# Patient Record
Sex: MALE | Race: BLACK OR AFRICAN AMERICAN | NOT HISPANIC OR LATINO | URBAN - METROPOLITAN AREA
[De-identification: names, ages, dates, MRNs, and addresses within clinical notes are randomized per-mention and may not be internally consistent; named-entity substitution may affect disease eponyms.]

---

## 2022-08-01 ENCOUNTER — INPATIENT (INPATIENT)
Facility: HOSPITAL | Age: 47
LOS: 2 days | Discharge: HOME | DRG: 603 | End: 2022-08-04
Attending: FAMILY MEDICINE | Admitting: GENERAL PRACTICE
Payer: SELF-PAY

## 2022-08-01 VITALS
DIASTOLIC BLOOD PRESSURE: 115 MMHG | HEART RATE: 119 BPM | SYSTOLIC BLOOD PRESSURE: 134 MMHG | WEIGHT: 304.9 LBS | OXYGEN SATURATION: 97 % | RESPIRATION RATE: 20 BRPM | TEMPERATURE: 97 F

## 2022-08-01 DIAGNOSIS — R73.9 HYPERGLYCEMIA, UNSPECIFIED: ICD-10-CM

## 2022-08-01 DIAGNOSIS — Z29.9 ENCOUNTER FOR PROPHYLACTIC MEASURES, UNSPECIFIED: ICD-10-CM

## 2022-08-01 DIAGNOSIS — M79.89 OTHER SPECIFIED SOFT TISSUE DISORDERS: ICD-10-CM

## 2022-08-01 DIAGNOSIS — R17 UNSPECIFIED JAUNDICE: ICD-10-CM

## 2022-08-01 DIAGNOSIS — A41.9 SEPSIS, UNSPECIFIED ORGANISM: ICD-10-CM

## 2022-08-01 DIAGNOSIS — E87.6 HYPOKALEMIA: ICD-10-CM

## 2022-08-01 LAB
ALBUMIN SERPL ELPH-MCNC: 2.9 G/DL — LOW (ref 3.3–5)
ALP SERPL-CCNC: 106 U/L — SIGNIFICANT CHANGE UP (ref 40–120)
ALT FLD-CCNC: 41 U/L — SIGNIFICANT CHANGE UP (ref 12–78)
ANION GAP SERPL CALC-SCNC: 8 MMOL/L — SIGNIFICANT CHANGE UP (ref 5–17)
APTT BLD: 28.3 SEC — SIGNIFICANT CHANGE UP (ref 27.5–35.5)
AST SERPL-CCNC: 36 U/L — SIGNIFICANT CHANGE UP (ref 15–37)
BASOPHILS # BLD AUTO: 0.05 K/UL — SIGNIFICANT CHANGE UP (ref 0–0.2)
BASOPHILS NFR BLD AUTO: 0.3 % — SIGNIFICANT CHANGE UP (ref 0–2)
BILIRUB SERPL-MCNC: 1.3 MG/DL — HIGH (ref 0.2–1.2)
BUN SERPL-MCNC: 11 MG/DL — SIGNIFICANT CHANGE UP (ref 7–23)
CALCIUM SERPL-MCNC: 9.2 MG/DL — SIGNIFICANT CHANGE UP (ref 8.5–10.1)
CHLORIDE SERPL-SCNC: 104 MMOL/L — SIGNIFICANT CHANGE UP (ref 96–108)
CO2 SERPL-SCNC: 24 MMOL/L — SIGNIFICANT CHANGE UP (ref 22–31)
CREAT SERPL-MCNC: 0.87 MG/DL — SIGNIFICANT CHANGE UP (ref 0.5–1.3)
EGFR: 108 ML/MIN/1.73M2 — SIGNIFICANT CHANGE UP
EOSINOPHIL # BLD AUTO: 0.1 K/UL — SIGNIFICANT CHANGE UP (ref 0–0.5)
EOSINOPHIL NFR BLD AUTO: 0.7 % — SIGNIFICANT CHANGE UP (ref 0–6)
GLUCOSE SERPL-MCNC: 161 MG/DL — HIGH (ref 70–99)
HCT VFR BLD CALC: 40.2 % — SIGNIFICANT CHANGE UP (ref 39–50)
HGB BLD-MCNC: 13.4 G/DL — SIGNIFICANT CHANGE UP (ref 13–17)
IMM GRANULOCYTES NFR BLD AUTO: 0.6 % — SIGNIFICANT CHANGE UP (ref 0–1.5)
INR BLD: 1.41 RATIO — HIGH (ref 0.88–1.16)
LACTATE SERPL-SCNC: 1.5 MMOL/L — SIGNIFICANT CHANGE UP (ref 0.7–2)
LYMPHOCYTES # BLD AUTO: 1.15 K/UL — SIGNIFICANT CHANGE UP (ref 1–3.3)
LYMPHOCYTES # BLD AUTO: 7.6 % — LOW (ref 13–44)
MCHC RBC-ENTMCNC: 28.3 PG — SIGNIFICANT CHANGE UP (ref 27–34)
MCHC RBC-ENTMCNC: 33.3 GM/DL — SIGNIFICANT CHANGE UP (ref 32–36)
MCV RBC AUTO: 85 FL — SIGNIFICANT CHANGE UP (ref 80–100)
MONOCYTES # BLD AUTO: 0.91 K/UL — HIGH (ref 0–0.9)
MONOCYTES NFR BLD AUTO: 6 % — SIGNIFICANT CHANGE UP (ref 2–14)
NEUTROPHILS # BLD AUTO: 12.89 K/UL — HIGH (ref 1.8–7.4)
NEUTROPHILS NFR BLD AUTO: 84.8 % — HIGH (ref 43–77)
NRBC # BLD: 0 /100 WBCS — SIGNIFICANT CHANGE UP (ref 0–0)
PLATELET # BLD AUTO: 201 K/UL — SIGNIFICANT CHANGE UP (ref 150–400)
POTASSIUM SERPL-MCNC: 3.4 MMOL/L — LOW (ref 3.5–5.3)
POTASSIUM SERPL-SCNC: 3.4 MMOL/L — LOW (ref 3.5–5.3)
PROT SERPL-MCNC: 8.6 G/DL — HIGH (ref 6–8.3)
PROTHROM AB SERPL-ACNC: 16.5 SEC — HIGH (ref 10.5–13.4)
RBC # BLD: 4.73 M/UL — SIGNIFICANT CHANGE UP (ref 4.2–5.8)
RBC # FLD: 12.7 % — SIGNIFICANT CHANGE UP (ref 10.3–14.5)
SARS-COV-2 RNA SPEC QL NAA+PROBE: SIGNIFICANT CHANGE UP
SODIUM SERPL-SCNC: 136 MMOL/L — SIGNIFICANT CHANGE UP (ref 135–145)
WBC # BLD: 15.19 K/UL — HIGH (ref 3.8–10.5)
WBC # FLD AUTO: 15.19 K/UL — HIGH (ref 3.8–10.5)

## 2022-08-01 PROCEDURE — 73590 X-RAY EXAM OF LOWER LEG: CPT | Mod: 26,RT

## 2022-08-01 PROCEDURE — 93010 ELECTROCARDIOGRAM REPORT: CPT

## 2022-08-01 PROCEDURE — 93971 EXTREMITY STUDY: CPT | Mod: 26,RT

## 2022-08-01 PROCEDURE — 99285 EMERGENCY DEPT VISIT HI MDM: CPT

## 2022-08-01 RX ORDER — CEFAZOLIN SODIUM 1 G
1000 VIAL (EA) INJECTION ONCE
Refills: 0 | Status: COMPLETED | OUTPATIENT
Start: 2022-08-01 | End: 2022-08-01

## 2022-08-01 RX ORDER — ACETAMINOPHEN 500 MG
650 TABLET ORAL EVERY 6 HOURS
Refills: 0 | Status: DISCONTINUED | OUTPATIENT
Start: 2022-08-01 | End: 2022-08-04

## 2022-08-01 RX ORDER — TRAMADOL HYDROCHLORIDE 50 MG/1
25 TABLET ORAL EVERY 6 HOURS
Refills: 0 | Status: DISCONTINUED | OUTPATIENT
Start: 2022-08-01 | End: 2022-08-04

## 2022-08-01 RX ORDER — POTASSIUM CHLORIDE 20 MEQ
40 PACKET (EA) ORAL ONCE
Refills: 0 | Status: COMPLETED | OUTPATIENT
Start: 2022-08-01 | End: 2022-08-01

## 2022-08-01 RX ORDER — ONDANSETRON 8 MG/1
4 TABLET, FILM COATED ORAL EVERY 8 HOURS
Refills: 0 | Status: DISCONTINUED | OUTPATIENT
Start: 2022-08-01 | End: 2022-08-04

## 2022-08-01 RX ORDER — CEFAZOLIN SODIUM 1 G
2000 VIAL (EA) INJECTION EVERY 8 HOURS
Refills: 0 | Status: DISCONTINUED | OUTPATIENT
Start: 2022-08-01 | End: 2022-08-04

## 2022-08-01 RX ORDER — TRAMADOL HYDROCHLORIDE 50 MG/1
50 TABLET ORAL EVERY 6 HOURS
Refills: 0 | Status: DISCONTINUED | OUTPATIENT
Start: 2022-08-01 | End: 2022-08-04

## 2022-08-01 RX ORDER — ENOXAPARIN SODIUM 100 MG/ML
40 INJECTION SUBCUTANEOUS EVERY 24 HOURS
Refills: 0 | Status: DISCONTINUED | OUTPATIENT
Start: 2022-08-01 | End: 2022-08-04

## 2022-08-01 RX ORDER — KETOROLAC TROMETHAMINE 30 MG/ML
15 SYRINGE (ML) INJECTION ONCE
Refills: 0 | Status: DISCONTINUED | OUTPATIENT
Start: 2022-08-01 | End: 2022-08-01

## 2022-08-01 RX ORDER — LANOLIN ALCOHOL/MO/W.PET/CERES
3 CREAM (GRAM) TOPICAL AT BEDTIME
Refills: 0 | Status: DISCONTINUED | OUTPATIENT
Start: 2022-08-01 | End: 2022-08-04

## 2022-08-01 RX ADMIN — Medication 15 MILLIGRAM(S): at 15:33

## 2022-08-01 RX ADMIN — Medication 100 MILLIGRAM(S): at 15:33

## 2022-08-01 RX ADMIN — Medication 40 MILLIEQUIVALENT(S): at 19:34

## 2022-08-01 NOTE — ED ADULT NURSE NOTE - NSIMPLEMENTINTERV_GEN_ALL_ED
Implemented All Universal Safety Interventions:  [place] to call system. Call bell, personal items and telephone within reach. Instruct patient to call for assistance. Room bathroom lighting operational. Non-slip footwear when patient is off stretcher. Physically safe environment: no spills, clutter or unnecessary equipment. Stretcher in lowest position, wheels locked, appropriate side rails in place.

## 2022-08-01 NOTE — ED PROVIDER NOTE - CLINICAL SUMMARY MEDICAL DECISION MAKING FREE TEXT BOX
46 y/o male with without reported PMHX presents today due to right leg swelling x 2 week. reports last night swelling worsened with calf pain. pt describes pain as pressure, non-radiating, and currently 6/10. pt denies chest pain, SOB, fever, trauma/fall, numbness/weakness, hemoptysis, recent travel/surgery, or any other complaints. pt well appearing, + swelling of leg and calf tenderness, concenr for dvt, labs us

## 2022-08-01 NOTE — ED PROVIDER NOTE - NS ED ATTENDING STATEMENT MOD
This was a shared visit with the NELLY. I reviewed and verified the documentation and independently performed the documented:

## 2022-08-01 NOTE — ED PROVIDER NOTE - PHYSICAL EXAMINATION
Constitutional: Awake, Alert, non-toxic. NAD. Well appearing, well nourished.   HEAD: Normocephalic, atraumatic.   EYES: EOM intact, conjunctiva and sclera are clear bilaterally.   ENT: No rhinorrhea, patent, mucous membranes pink/moist, no drooling or stridor.   NECK: Supple, non-tender  CARDIOVASCULAR: Normal S1, S2; regular rate and rhythm.  RESPIRATORY: Normal respiratory effort; breath sounds CTAB, no wheezes, rhonchi, or rales. Speaking in full sentences. No accessory muscle use.   ABDOMEN: Soft; non-tender, non-distended.   EXTREMITIES: Full passive and active ROM in all extremities; non-tender to palpation; distal pulses palpable and symmetric, (+) RLE edema with calf TTP, no erythema   SKIN: Warm, dry; good skin turgor, no apparent lesions or rashes, no ecchymosis, brisk capillary refill.  NEURO: A&O x3. Sensory and motor functions are grossly intact. Speech is normal. Appearance and judgement seem appropriate for gender and age.

## 2022-08-01 NOTE — ED PROVIDER NOTE - OBJECTIVE STATEMENT
48 y/o male with without reported PMHX presents today due to right leg swelling x 2 week. reports last night swelling worsened with calf pain. pt describes pain as pressure, non-radiating, and currently 6/10. pt denies chest pain, SOB, fever, trauma/fall, numbness/weakness, hemoptysis, recent travel/surgery, or any other complaints.

## 2022-08-01 NOTE — H&P ADULT - NSHPREVIEWOFSYSTEMS_GEN_ALL_CORE
CONSTITUTIONAL: denies fever, chills, fatigue, weakness  HEENT: denies blurred vision, sore throat  SKIN: denies new lesions, rash  CARDIOVASCULAR: denies chest pain, chest pressure, palpitations  RESPIRATORY: denies shortness of breath, cough  GASTROINTESTINAL: endorses diarrhea; denies nausea, vomiting, abdominal pain, melena or hematochezia  GENITOURINARY: denies dysuria, discharge  NEUROLOGICAL: endorses headache; denies numbness, focal weakness  MUSCULOSKELETAL: denies new joint pain, muscle aches  LYMPHATICS: endorses tender lymph node in R groin   HEMATOLOGIC: denies gross bleeding, bruising CONSTITUTIONAL: denies fever, chills, fatigue, weakness  HEENT: denies blurred vision, sore throat  SKIN: denies new lesions, rash  CARDIOVASCULAR: denies chest pain, chest pressure, palpitations  RESPIRATORY: denies shortness of breath, cough  GASTROINTESTINAL: endorses diarrhea; denies nausea, vomiting, abdominal pain, melena or hematochezia  GENITOURINARY: denies dysuria, discharge  NEUROLOGICAL: endorses headache; denies numbness, focal weakness  MUSCULOSKELETAL: endorses R lower extremity swelling below the knee; denies new joint pain, muscle aches  LYMPHATICS: endorses tender lymph node in R groin   HEMATOLOGIC: denies gross bleeding, bruising

## 2022-08-01 NOTE — ED ADULT NURSE NOTE - OBJECTIVE STATEMENT
Patient is a 48yo male without reported PMHX presents today due to right leg swelling x 2 week. reports last night swelling worsened with calf pain. pt describes pain as pressure, non-radiating, and currently 6/10. pt denies chest pain, SOB, fever, trauma/fall, numbness/weakness, hemoptysis, recent travel/surgery, or any other complaints.

## 2022-08-01 NOTE — H&P ADULT - PROBLEM SELECTOR PLAN 3
3.4   - Repleted  - F/u AM CMP   - Replete electrolytes as needed -Lovenox 40 qd Tbili 1.3  -check hepatic function panel in AM  -trend CMP daily

## 2022-08-01 NOTE — H&P ADULT - NSHPPHYSICALEXAM_GEN_ALL_CORE
T(C): 37.6 (08-01-22 @ 14:31), Max: 37.6 (08-01-22 @ 14:31)  HR: 106 (08-01-22 @ 14:31) (106 - 119)  BP: 120/85 (08-01-22 @ 14:31) (120/85 - 134/115)  RR: 18 (08-01-22 @ 14:31) (18 - 20)  SpO2: 96% (08-01-22 @ 14:31) (96% - 97%)    GENERAL: patient is obese, appears well, no acute distress, appropriately interactive  EYES: sclera clear  ENMT: oropharynx clear without erythema, no exudates, moist mucous membranes  NECK: supple, soft  LUNGS: good air entry bilaterally, clear to auscultation, symmetric breath sounds, no wheezing or rhonchi appreciated  HEART: soft S1/S2, regular rate and rhythm, no murmurs noted  GASTROINTESTINAL: abdomen is soft, nontender, nondistended, normoactive bowel sounds  INTEGUMENT: R lower extremity is erythematous, edematous, warm to touch, skin appears dry with areas of broken skin   MUSCULOSKELETAL: no clubbing or cyanosis, no obvious deformity  NEUROLOGIC: awake, alert, oriented x3, good muscle tone in all 4 extremities  HEME/LYMPH: swollen, tender inguinal lymph node, no obvious ecchymosis or petechiae

## 2022-08-01 NOTE — H&P ADULT - ASSESSMENT
Pt is a 48 yo M with no significant PMHx presents to the ED with a 2 week hx of a swollen R lower extremity, admitted for possible cellulitis.       ED Course:   Vitals: BP: 120/85, HR: 106, Temp: 99.7 rectally, RR: 18, SpO2: 96% on RA  Labs:   K+:  3.4     WBC: 15.19    Glucose: 161   RLE Doppler: Negative for DVT  Pt is a 48 yo M with no significant PMHx presents to the ED with a 2 week hx of a swollen R lower extremity, admitted for sepsis 2/2 possible cellulitis.  Pt is a 48 yo M with no significant PMHx presents to the ED with a 2 week hx of a swollen R lower extremity, admitted for possible cellulitis.     likely in the setting of cellulitis of the R Lower Extremity  - s/p Cefazolin and Toradol 15 mg IVP x1 in the ED   - Continue Cefazolin   - Pain regimen: Tylenol for mild, Tramadol 25 mg for moderate and Tramadol 50 mg for severe   - Tylenol PRN fever  - F/u BCx x2  - F/u UA, UCx and CXR to evaluate for alternative sources   - Monitor fever and leukocytosis  - MRSA PCR Pt is a 48 yo M with no significant PMHx presents to the ED with a 2 week hx of a swollen R lower extremity, admitted for possible cellulitis.  Pt is a 46 yo M with no significant PMHx presents to the ED with a 2 week hx of a swollen R lower extremity, admitted for cellulitis.

## 2022-08-01 NOTE — H&P ADULT - NSHPSOCIALHISTORY_GEN_ALL_CORE
Lives: with nephew and cousin in a house   ADLs: independently completes all ADLs   Vaccination: COVID (Pfizer x2, no booster), No Flu   Occupation: [employer]  Alcohol Use: socially, infrequent   Tobacco Use: never   Recreational Drug Use: never

## 2022-08-01 NOTE — H&P ADULT - PROBLEM SELECTOR PLAN 2
- Venous Doppler Completed: no evidence for DVT   - Suspicion for cellulitis  - Blood cultures sent to evaluate etiology, f/u   - Cefazolin 1000 mg IV Solution 50 mL - Venous Doppler with no evidence for DVT   - likely 2/2 cellulitis  - Plan as above - Venous Doppler with no evidence for DVT   - likely 2/2 cellulitis  - Plan as above  - if swelling does not improve with IV antibiotics, consider repeating doppler 3.4 on admission  - Repleted  - F/u AM CMP   - Replete electrolytes as needed Glucose 161 on admission  -no known hx of DM  -f/u AM HbA1c

## 2022-08-01 NOTE — H&P ADULT - ATTENDING COMMENTS
Pt is a 46 yo M with no significant PMHx presents to the ED with a 2 week hx of a swollen R lower extremity, admitted for sepsis 2/2 possible cellulitis.     Admitted for observation. Dopplers negative for DVT. Ancef for non purulent cellulitis. Pt is a 46 yo M with no significant PMHx presents to the ED with a 2 week hx of a swollen R lower extremity, admitted for sepsis 2/2 possible cellulitis.     Admitted for observation. Dopplers negative for DVT. Ancef for non purulent cellulitis. f/u culture data. If symptoms do not improve consider repeating doppler. Check HbA1c with elevated glucose. PT consult as pt has difficulty ambulating with swollen leg.

## 2022-08-01 NOTE — H&P ADULT - PROBLEM SELECTOR PLAN 1
Elevated HR (106) in the setting of Leukocytosis (15.19)   - Cefazolin 1000 mg IV Solution 50 mL   - Likely in the setting of cellulitis of the R Lower Extremity  - Blood culture, Urine cultures, CXR, UA to evaluate source  - 1 L bolus for IV hydration Pt meets sepsis criteria given tachycardia HR: 106 and leukocytosis WBC: 15.19; source likely in the setting of cellulitis of the R Lower Extremity  - s/p Cefazolin and Toradol 15 mg IVP x1 in the ED   - Continue Cefazolin   - Pain regimen: Tylenol for mild, Tramadol 25 mg for moderate and Tramadol 50 mg for severe   - Tylenol PRN fever  - F/u BCx x2  - F/u UA, UCx and CXR to evaluate for alternative sources   - Monitor fever and leukocytosis Pt meets sepsis criteria given tachycardia HR: 106 and leukocytosis WBC: 15.19; source likely in the setting of cellulitis of the R Lower Extremity  - s/p Cefazolin and Toradol 15 mg IVP x1 in the ED   - Continue Cefazolin   - Pain regimen: Tylenol for mild, Tramadol 25 mg for moderate and Tramadol 50 mg for severe   - Tylenol PRN fever  - F/u BCx x2  - F/u UA, UCx and CXR to evaluate for alternative sources   - Monitor fever and leukocytosis  - MRSA PCR - likely in the setting of cellulitis of the R Lower Extremity  - Venous Doppler with no evidence for DVT   - s/p Cefazolin and Toradol 15 mg IVP x1 in the ED   - Continue Cefazolin   - Pain regimen: Tylenol for mild, Tramadol 25 mg for moderate and Tramadol 50 mg for severe   - Tylenol PRN fever  - F/u BCx x2  - Monitor fever and leukocytosis  - F/u MRSA PCR  - if swelling does not improve with IV antibiotics, consider repeating doppler

## 2022-08-01 NOTE — H&P ADULT - HISTORY OF PRESENT ILLNESS
Servando Belcher is a 46 yo M with no significant PMHx presents to the ED with a 2 week hx of a swollen R lower extremity. Pt states that for the past 2 weeks he has noticed his leg getting progressively more swollen and becoming increasingly painful. 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 Pt denies taking any at home medications for pain and endorses provocation of pain when standing or walking. Pt describes an achy, 5/10 pain at rest and a sharp 9/10 pain in calf and foot when standing. Pt endorses a 2 day hx of non-bloody diarrhea and intermittent headaches; pt denies fever, chills, chest pain, palpitations, SOB, cough, abdominal pain, nausea, vomiting, constipation, urinary frequency, urgency, or dysuria, changes in vision, dizziness, numbness, tingling.    ED Course:   Vitals: BP: 120/85, HR: 106, Temp: 99.7 rectally, RR: 18, SpO2: 96% on RA  Labs:                      13.4   15.19 )-----------( 201      ( 01 Aug 2022 12:45 )             40.2     08-01    136  |  104  |  11  ----------------------------<  161<H>  3.4<L>   |  24  |  0.87    Ca    9.2      01 Aug 2022 12:45  TPro  8.6<H>  /  Alb  2.9<L>  /  TBili  1.3<H>  /  DBili  x   /  AST  36  /  ALT  41  /  AlkPhos  106  08-01  PT/INR - ( 01 Aug 2022 12:45 )   PT: 16.5 sec;   INR: 1.41 ratio    PTT - ( 01 Aug 2022 12:45 )  PTT:28.3 sec    EKG:      46 yo M with no significant PMHx presents to the ED with a 2 week hx of a swollen R lower extremity. Pt states that for the past 2 weeks he has noticed his leg getting progressively more swollen and becoming increasingly painful. 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 Pt denies taking any at home medications for pain and endorses provocation of pain when standing or walking. Pt describes an achy, 5/10 pain at rest and a sharp 9/10 pain in calf and foot when standing. Pt endorses a 2 day hx of non-bloody diarrhea and intermittent headaches; pt denies fever, chills, chest pain, palpitations, SOB, cough, abdominal pain, nausea, vomiting, constipation, urinary frequency, urgency, or dysuria, changes in vision, dizziness, numbness, tingling.    ED Course:   Vitals: BP: 120/85, HR: 106, Temp: 99.7 rectally, RR: 18, SpO2: 96% on RA  Labs:   K+:  3.4     WBC: 15.19    Glucose: 161   RLE Doppler: Negative for DVT   EKG:        46 yo M with no significant PMHx presents to the ED with a 2 week hx of a swollen R lower extremity. Pt states that for the past 2 weeks he has noticed his leg getting progressively more swollen and becoming increasingly painful. 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 Pt denies taking any at home medications for pain and endorses provocation of pain when standing or walking. Pt describes an achy, 5/10 pain at rest and a sharp 9/10 pain in calf and foot when standing. Pt endorses a 2 day hx of non-bloody diarrhea and intermittent headaches; pt denies fever, chills, chest pain, palpitations, SOB, cough, abdominal pain, nausea, vomiting, constipation, urinary frequency, urgency, or dysuria, changes in vision, dizziness, numbness, tingling.    ED Course:   Vitals: BP: 120/85, HR: 106, Temp: 99.7 rectally, RR: 18, SpO2: 96% on RA  Labs:   K+:  3.4     WBC: 15.19    Glucose: 161   RLE Doppler: Negative for DVT   Received Cefazolin and Toradol 15 mg IVP x1 in the ED

## 2022-08-02 DIAGNOSIS — L03.90 CELLULITIS, UNSPECIFIED: ICD-10-CM

## 2022-08-02 LAB
A1C WITH ESTIMATED AVERAGE GLUCOSE RESULT: 6 % — HIGH (ref 4–5.6)
ALBUMIN SERPL ELPH-MCNC: 2.6 G/DL — LOW (ref 3.3–5)
ALP SERPL-CCNC: 94 U/L — SIGNIFICANT CHANGE UP (ref 40–120)
ALT FLD-CCNC: 36 U/L — SIGNIFICANT CHANGE UP (ref 12–78)
ANION GAP SERPL CALC-SCNC: 6 MMOL/L — SIGNIFICANT CHANGE UP (ref 5–17)
AST SERPL-CCNC: 32 U/L — SIGNIFICANT CHANGE UP (ref 15–37)
BASOPHILS # BLD AUTO: 0.04 K/UL — SIGNIFICANT CHANGE UP (ref 0–0.2)
BASOPHILS NFR BLD AUTO: 0.4 % — SIGNIFICANT CHANGE UP (ref 0–2)
BILIRUB SERPL-MCNC: 0.8 MG/DL — SIGNIFICANT CHANGE UP (ref 0.2–1.2)
BUN SERPL-MCNC: 13 MG/DL — SIGNIFICANT CHANGE UP (ref 7–23)
CALCIUM SERPL-MCNC: 8.9 MG/DL — SIGNIFICANT CHANGE UP (ref 8.5–10.1)
CHLORIDE SERPL-SCNC: 106 MMOL/L — SIGNIFICANT CHANGE UP (ref 96–108)
CO2 SERPL-SCNC: 26 MMOL/L — SIGNIFICANT CHANGE UP (ref 22–31)
CREAT SERPL-MCNC: 0.78 MG/DL — SIGNIFICANT CHANGE UP (ref 0.5–1.3)
EGFR: 112 ML/MIN/1.73M2 — SIGNIFICANT CHANGE UP
EOSINOPHIL # BLD AUTO: 0.15 K/UL — SIGNIFICANT CHANGE UP (ref 0–0.5)
EOSINOPHIL NFR BLD AUTO: 1.4 % — SIGNIFICANT CHANGE UP (ref 0–6)
ESTIMATED AVERAGE GLUCOSE: 126 MG/DL — HIGH (ref 68–114)
GLUCOSE SERPL-MCNC: 115 MG/DL — HIGH (ref 70–99)
HCT VFR BLD CALC: 35.9 % — LOW (ref 39–50)
HGB BLD-MCNC: 12 G/DL — LOW (ref 13–17)
IMM GRANULOCYTES NFR BLD AUTO: 0.4 % — SIGNIFICANT CHANGE UP (ref 0–1.5)
LYMPHOCYTES # BLD AUTO: 1.58 K/UL — SIGNIFICANT CHANGE UP (ref 1–3.3)
LYMPHOCYTES # BLD AUTO: 15 % — SIGNIFICANT CHANGE UP (ref 13–44)
MCHC RBC-ENTMCNC: 28.7 PG — SIGNIFICANT CHANGE UP (ref 27–34)
MCHC RBC-ENTMCNC: 33.4 GM/DL — SIGNIFICANT CHANGE UP (ref 32–36)
MCV RBC AUTO: 85.9 FL — SIGNIFICANT CHANGE UP (ref 80–100)
MONOCYTES # BLD AUTO: 0.88 K/UL — SIGNIFICANT CHANGE UP (ref 0–0.9)
MONOCYTES NFR BLD AUTO: 8.3 % — SIGNIFICANT CHANGE UP (ref 2–14)
NEUTROPHILS # BLD AUTO: 7.86 K/UL — HIGH (ref 1.8–7.4)
NEUTROPHILS NFR BLD AUTO: 74.5 % — SIGNIFICANT CHANGE UP (ref 43–77)
NRBC # BLD: 0 /100 WBCS — SIGNIFICANT CHANGE UP (ref 0–0)
PLATELET # BLD AUTO: 190 K/UL — SIGNIFICANT CHANGE UP (ref 150–400)
POTASSIUM SERPL-MCNC: 3.3 MMOL/L — LOW (ref 3.5–5.3)
POTASSIUM SERPL-SCNC: 3.3 MMOL/L — LOW (ref 3.5–5.3)
PROT SERPL-MCNC: 7.7 G/DL — SIGNIFICANT CHANGE UP (ref 6–8.3)
RBC # BLD: 4.18 M/UL — LOW (ref 4.2–5.8)
RBC # FLD: 12.4 % — SIGNIFICANT CHANGE UP (ref 10.3–14.5)
SODIUM SERPL-SCNC: 138 MMOL/L — SIGNIFICANT CHANGE UP (ref 135–145)
WBC # BLD: 10.55 K/UL — HIGH (ref 3.8–10.5)
WBC # FLD AUTO: 10.55 K/UL — HIGH (ref 3.8–10.5)

## 2022-08-02 PROCEDURE — 99232 SBSQ HOSP IP/OBS MODERATE 35: CPT

## 2022-08-02 RX ORDER — POTASSIUM CHLORIDE 20 MEQ
40 PACKET (EA) ORAL ONCE
Refills: 0 | Status: DISCONTINUED | OUTPATIENT
Start: 2022-08-02 | End: 2022-08-02

## 2022-08-02 RX ORDER — POTASSIUM CHLORIDE 20 MEQ
40 PACKET (EA) ORAL ONCE
Refills: 0 | Status: COMPLETED | OUTPATIENT
Start: 2022-08-02 | End: 2022-08-02

## 2022-08-02 RX ADMIN — Medication 100 MILLIGRAM(S): at 01:40

## 2022-08-02 RX ADMIN — Medication 40 MILLIEQUIVALENT(S): at 09:56

## 2022-08-02 RX ADMIN — Medication 100 MILLIGRAM(S): at 09:56

## 2022-08-02 RX ADMIN — Medication 100 MILLIGRAM(S): at 17:02

## 2022-08-02 RX ADMIN — ENOXAPARIN SODIUM 40 MILLIGRAM(S): 100 INJECTION SUBCUTANEOUS at 01:40

## 2022-08-02 NOTE — PATIENT PROFILE ADULT - FALL HARM RISK - UNIVERSAL INTERVENTIONS
Bed in lowest position, wheels locked, appropriate side rails in place/Call bell, personal items and telephone in reach/Instruct patient to call for assistance before getting out of bed or chair/Non-slip footwear when patient is out of bed/[place] to call system/Physically safe environment - no spills, clutter or unnecessary equipment/Purposeful Proactive Rounding/Room/bathroom lighting operational, light cord in reach

## 2022-08-02 NOTE — PATIENT PROFILE ADULT - FUNCTIONAL ASSESSMENT - BASIC MOBILITY 6.
3 = A little assistance 4-calculated by average/Not able to assess (calculate score using [facility] averaging method)

## 2022-08-02 NOTE — PHYSICAL THERAPY INITIAL EVALUATION ADULT - PERTINENT HX OF CURRENT PROBLEM, REHAB EVAL
48 yo M with no significant PMHx presents to the ED with a 2 week hx of a swollen R lower extremity. Admitted for r/o cellulitis.

## 2022-08-02 NOTE — PROGRESS NOTE ADULT - ASSESSMENT
1. RLE cellulitis    - improving on ancef     - wbc improved from 15 to 10    - afebrile     - improving, possible transition to po in 24-48h based on clinical presentation    - cont tramadol prn pain     2. hypokalemia    - potassium chloride 40mEq x 1 dose. packets ordered per patient's request     DVT proph: lovenox 40mg daily

## 2022-08-03 LAB
ANION GAP SERPL CALC-SCNC: 8 MMOL/L — SIGNIFICANT CHANGE UP (ref 5–17)
BUN SERPL-MCNC: 11 MG/DL — SIGNIFICANT CHANGE UP (ref 7–23)
CALCIUM SERPL-MCNC: 8.9 MG/DL — SIGNIFICANT CHANGE UP (ref 8.5–10.1)
CHLORIDE SERPL-SCNC: 106 MMOL/L — SIGNIFICANT CHANGE UP (ref 96–108)
CO2 SERPL-SCNC: 24 MMOL/L — SIGNIFICANT CHANGE UP (ref 22–31)
CREAT SERPL-MCNC: 0.89 MG/DL — SIGNIFICANT CHANGE UP (ref 0.5–1.3)
EGFR: 108 ML/MIN/1.73M2 — SIGNIFICANT CHANGE UP
GLUCOSE SERPL-MCNC: 177 MG/DL — HIGH (ref 70–99)
HCT VFR BLD CALC: 36.9 % — LOW (ref 39–50)
HGB BLD-MCNC: 12.2 G/DL — LOW (ref 13–17)
MCHC RBC-ENTMCNC: 28.6 PG — SIGNIFICANT CHANGE UP (ref 27–34)
MCHC RBC-ENTMCNC: 33.1 GM/DL — SIGNIFICANT CHANGE UP (ref 32–36)
MCV RBC AUTO: 86.4 FL — SIGNIFICANT CHANGE UP (ref 80–100)
NRBC # BLD: 0 /100 WBCS — SIGNIFICANT CHANGE UP (ref 0–0)
PLATELET # BLD AUTO: 234 K/UL — SIGNIFICANT CHANGE UP (ref 150–400)
POTASSIUM SERPL-MCNC: 3.5 MMOL/L — SIGNIFICANT CHANGE UP (ref 3.5–5.3)
POTASSIUM SERPL-SCNC: 3.5 MMOL/L — SIGNIFICANT CHANGE UP (ref 3.5–5.3)
RBC # BLD: 4.27 M/UL — SIGNIFICANT CHANGE UP (ref 4.2–5.8)
RBC # FLD: 12.6 % — SIGNIFICANT CHANGE UP (ref 10.3–14.5)
SODIUM SERPL-SCNC: 138 MMOL/L — SIGNIFICANT CHANGE UP (ref 135–145)
WBC # BLD: 5.82 K/UL — SIGNIFICANT CHANGE UP (ref 3.8–10.5)
WBC # FLD AUTO: 5.82 K/UL — SIGNIFICANT CHANGE UP (ref 3.8–10.5)

## 2022-08-03 PROCEDURE — 99233 SBSQ HOSP IP/OBS HIGH 50: CPT | Mod: GC

## 2022-08-03 RX ORDER — POTASSIUM CHLORIDE 20 MEQ
40 PACKET (EA) ORAL ONCE
Refills: 0 | Status: COMPLETED | OUTPATIENT
Start: 2022-08-03 | End: 2022-08-03

## 2022-08-03 RX ADMIN — Medication 100 MILLIGRAM(S): at 17:38

## 2022-08-03 RX ADMIN — Medication 100 MILLIGRAM(S): at 09:33

## 2022-08-03 RX ADMIN — Medication 40 MILLIEQUIVALENT(S): at 09:36

## 2022-08-03 RX ADMIN — Medication 3 MILLIGRAM(S): at 23:06

## 2022-08-03 RX ADMIN — TRAMADOL HYDROCHLORIDE 25 MILLIGRAM(S): 50 TABLET ORAL at 23:04

## 2022-08-03 RX ADMIN — ENOXAPARIN SODIUM 40 MILLIGRAM(S): 100 INJECTION SUBCUTANEOUS at 01:02

## 2022-08-03 RX ADMIN — Medication 100 MILLIGRAM(S): at 01:03

## 2022-08-03 NOTE — PROGRESS NOTE ADULT - ASSESSMENT
1. RLE cellulitis    - improving on  1gm  ivpb  ancef  q8hr     - wbc improved from 15 to 10    - afebrile     - improving, possible transition to po in 24-48h based on clinical presentation    - cont tramadol prn pain     2. hypokalemia - replaced     - potassium chloride 40mEq x 1 dose. packets ordered per patient's request     DVT proph: lovenox 40mg daily  1. RLE cellulitis    - improving on  1gm  ivpb  ancef  q8hr     - wbc improved from 15 to 10    - afebrile , blood cult neg     - improving, possible transition to po in 24-48h based on clinical presentation    - cont tramadol prn pain     2. hypokalemia - replaced     - potassium chloride 40mEq x 1 dose. packets ordered per patient's request     DVT proph: lovenox 40mg daily

## 2022-08-03 NOTE — PROGRESS NOTE ADULT - SUBJECTIVE AND OBJECTIVE BOX
Patient is a 47y old  Male who presents with a chief complaint of Observation, RLE swelling rule out cellulitis (01 Aug 2022 15:57)      FROM ADMISSION H+P:   HPI:  48 yo M with no significant PMHx presents to the ED with a 2 week hx of a swollen R lower extremity. Pt states that for the past 2 weeks he has noticed his leg getting progressively more swollen and becoming increasingly painful. 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 Pt denies taking any at home medications for pain and endorses provocation of pain when standing or walking. Pt describes an achy, 5/10 pain at rest and a sharp 9/10 pain in calf and foot when standing. Pt endorses a 2 day hx of non-bloody diarrhea and intermittent headaches; pt denies fever, chills, chest pain, palpitations, SOB, cough, abdominal pain, nausea, vomiting, constipation, urinary frequency, urgency, or dysuria, changes in vision, dizziness, numbness, tingling.    ED Course:   Vitals: BP: 120/85, HR: 106, Temp: 99.7 rectally, RR: 18, SpO2: 96% on RA  Labs:   K+:  3.4     WBC: 15.19    Glucose: 161   RLE Doppler: Negative for DVT   Received Cefazolin and Toradol 15 mg IVP x1 in the ED     (01 Aug 2022 15:57)      INTERVAL HPI/OVERNIGHT EVENTS: Patient was seen and examined. No acute events occurred overnight. He reports his RLE is still swollen and tender at times. It is improved from yesterday. afebrile     I&O's Summary      LABS:                        12.0   10.55 )-----------( 190      ( 02 Aug 2022 06:10 )             35.9     08-02    138  |  106  |  13  ----------------------------<  115<H>  3.3<L>   |  26  |  0.78    Ca    8.9      02 Aug 2022 06:10    TPro  7.7  /  Alb  2.6<L>  /  TBili  0.8  /  DBili  0.2  /  AST  32  /  ALT  36  /  AlkPhos  94  08-02    PT/INR - ( 01 Aug 2022 12:45 )   PT: 16.5 sec;   INR: 1.41 ratio         PTT - ( 01 Aug 2022 12:45 )  PTT:28.3 sec    CAPILLARY BLOOD GLUCOSE                MEDICATIONS  (STANDING):  ceFAZolin   IVPB 2000 milliGRAM(s) IV Intermittent every 8 hours  enoxaparin Injectable 40 milliGRAM(s) SubCutaneous every 24 hours    MEDICATIONS  (PRN):  acetaminophen     Tablet .. 650 milliGRAM(s) Oral every 6 hours PRN Temp greater or equal to 38C (100.4F), Mild Pain (1 - 3)  melatonin 3 milliGRAM(s) Oral at bedtime PRN Insomnia  ondansetron Injectable 4 milliGRAM(s) IV Push every 8 hours PRN Nausea and/or Vomiting  traMADol 25 milliGRAM(s) Oral every 6 hours PRN Moderate Pain (4 - 6)  traMADol 50 milliGRAM(s) Oral every 6 hours PRN Severe Pain (7 - 10)      REVIEW OF SYSTEMS:  CONSTITUTIONAL: No fever or chills  HEENT:  No headache, no sore throat  RESPIRATORY: No cough, wheezing, or shortness of breath  CARDIOVASCULAR: No chest pain, palpitations  GASTROINTESTINAL: No abdominal pain, nausea, vomiting, or diarrhea  GENITOURINARY: No dysuria, frequency, or hematuria  NEUROLOGICAL: no focal weakness or dizziness  MUSCULOSKELETAL: no myalgias  PSYCH: no recent changes in mood    RADIOLOGY & ADDITIONAL TESTS:    Imaging Personally Reviewed:  [x] YES  [ ] NO    Consultant(s) Notes Reviewed:  [x] YES  [ ] NO    PHYSICAL EXAM:  T(C): 36.7 (08-02-22 @ 05:26), Max: 37.6 (08-01-22 @ 14:31)  HR: 87 (08-02-22 @ 05:26) (87 - 119)  BP: 126/81 (08-02-22 @ 05:26) (117/77 - 134/115)  RR: 18 (08-02-22 @ 05:26) (16 - 20)  SpO2: 95% (08-02-22 @ 05:26) (94% - 98%)    GENERAL: NAD, well-developed, well-groomed  HEENT:  anicteric, moist mucous membranes  CHEST/LUNG:  CTA b/l, no rales, wheezes, or rhonchi  HEART:  RRR, S1, S2  ABDOMEN:  BS+, soft, nontender, nondistended  EXTREMITIES: RLE: +1 edema, warm to touch, + tenderness with palpation. no open wounds.   NERVOUS SYSTEM: answers questions and follows commands appropriately  PSYCH: normal affect    Care Discussed with Consultants/Other Providers [x] YES  [ ] NO
Patient is a 47y old  Male who presents with a chief complaint of Observation, RLE swelling rule out cellulitis (02 Aug 2022 09:23)    pt seen and examine today alert awake , rt leg swelling coming down , no fever , oob .   INTERVAL HPI/OVERNIGHT EVENTS:     T(C): 36.4 (08-03-22 @ 05:45), Max: 37.3 (08-02-22 @ 21:18)  HR: 93 (08-03-22 @ 05:45) (85 - 105)  BP: 128/82 (08-03-22 @ 05:45) (117/78 - 132/87)  RR: 18 (08-03-22 @ 05:45) (16 - 18)  SpO2: 94% (08-03-22 @ 05:45) (94% - 97%)  Wt(kg): --  I&O's Summary      REVIEW OF SYSTEMS:  CONSTITUTIONAL: No fever, weight loss, or fatigue  EYES: No eye pain, visual disturbances, or discharge  ENMT:   No sinus or throat pain  NECK: No pain or stiffness  BREASTS: No pain, no masses  RESPIRATORY: No cough, wheezing, chills  ; No shortness of breath  CARDIOVASCULAR: No chest pain, palpitations, dizziness, or leg swelling  GASTROINTESTINAL: No abdominal or epigastric pain. No nausea, vomiting,  No diarrhea or constipation.   GENITOURINARY: No dysuria, frequency, hematuria, or incontinence  NEUROLOGICAL: No headaches, memory loss, loss of strength, numbness, or tremors  SKIN: No itching, burning, rashes, or lesions   MUSCULOSKELETAL: No joint pain or swelling; No muscle, back, or extremity pain   rt leg pain +    PHYSICAL EXAM:  GENERAL: NAD,   HEAD:  Atraumatic, Normocephalic  EYES: EOMI, PERRLA, conjunctiva and sclera clear  ENMT:  Moist mucous membranes  NECK: Supple, No JVD  NERVOUS SYSTEM:  Alert & Oriented X3; Motor Strength 5/5 B/L upper and lower extremities; DTRs 2+ intact and symmetric  CHEST/LUNG:  percussion bilaterally; No rales, rhonchi, wheezing,   HEART: Regular rate and rhythm; No murmurs,  no tachy   ABDOMEN: Soft, Nontender, Nondistended; Bowel sounds present  EXTREMITIES:  2+ Peripheral Pulses, No clubbing, cyanosis, or edema  SKIN: No rashes or lesions , rt leg warmth and swelling , no wound     MEDICATIONS  (STANDING):  ceFAZolin   IVPB 2000 milliGRAM(s) IV Intermittent every 8 hours  enoxaparin Injectable 40 milliGRAM(s) SubCutaneous every 24 hours    MEDICATIONS  (PRN):  acetaminophen     Tablet .. 650 milliGRAM(s) Oral every 6 hours PRN Temp greater or equal to 38C (100.4F), Mild Pain (1 - 3)  melatonin 3 milliGRAM(s) Oral at bedtime PRN Insomnia  ondansetron Injectable 4 milliGRAM(s) IV Push every 8 hours PRN Nausea and/or Vomiting  traMADol 25 milliGRAM(s) Oral every 6 hours PRN Moderate Pain (4 - 6)  traMADol 50 milliGRAM(s) Oral every 6 hours PRN Severe Pain (7 - 10)      LABS:                        12.2   5.82  )-----------( 234      ( 03 Aug 2022 08:40 )             36.9     08-03    138  |  106  |  11  ----------------------------<  177<H>  3.5   |  24  |  0.89    Ca    8.9      03 Aug 2022 08:40    TPro  7.7  /  Alb  2.6<L>  /  TBili  0.8  /  DBili  0.2  /  AST  32  /  ALT  36  /  AlkPhos  94  08-02    PT/INR - ( 01 Aug 2022 12:45 )   PT: 16.5 sec;   INR: 1.41 ratio         PTT - ( 01 Aug 2022 12:45 )  PTT:28.3 sec              08-01 @ 16:05   No growth to date.  --  --  08-01 @ 16:00   No growth to date.  --  --          RADIOLOGY & ADDITIONAL TESTS:    Imaging Personally Reviewed:   no new test     Advance Directives:  full code

## 2022-08-04 VITALS
HEART RATE: 93 BPM | DIASTOLIC BLOOD PRESSURE: 85 MMHG | SYSTOLIC BLOOD PRESSURE: 128 MMHG | TEMPERATURE: 98 F | RESPIRATION RATE: 18 BRPM | OXYGEN SATURATION: 96 %

## 2022-08-04 LAB
ANION GAP SERPL CALC-SCNC: 7 MMOL/L — SIGNIFICANT CHANGE UP (ref 5–17)
BASOPHILS # BLD AUTO: 0.04 K/UL — SIGNIFICANT CHANGE UP (ref 0–0.2)
BASOPHILS NFR BLD AUTO: 0.4 % — SIGNIFICANT CHANGE UP (ref 0–2)
BUN SERPL-MCNC: 9 MG/DL — SIGNIFICANT CHANGE UP (ref 7–23)
CALCIUM SERPL-MCNC: 9.3 MG/DL — SIGNIFICANT CHANGE UP (ref 8.5–10.1)
CHLORIDE SERPL-SCNC: 103 MMOL/L — SIGNIFICANT CHANGE UP (ref 96–108)
CO2 SERPL-SCNC: 27 MMOL/L — SIGNIFICANT CHANGE UP (ref 22–31)
CREAT SERPL-MCNC: 0.79 MG/DL — SIGNIFICANT CHANGE UP (ref 0.5–1.3)
EGFR: 112 ML/MIN/1.73M2 — SIGNIFICANT CHANGE UP
EOSINOPHIL # BLD AUTO: 0.19 K/UL — SIGNIFICANT CHANGE UP (ref 0–0.5)
EOSINOPHIL NFR BLD AUTO: 1.9 % — SIGNIFICANT CHANGE UP (ref 0–6)
GLUCOSE SERPL-MCNC: 159 MG/DL — HIGH (ref 70–99)
HCT VFR BLD CALC: 38.1 % — LOW (ref 39–50)
HGB BLD-MCNC: 12.5 G/DL — LOW (ref 13–17)
IMM GRANULOCYTES NFR BLD AUTO: 1.1 % — SIGNIFICANT CHANGE UP (ref 0–1.5)
LYMPHOCYTES # BLD AUTO: 1.87 K/UL — SIGNIFICANT CHANGE UP (ref 1–3.3)
LYMPHOCYTES # BLD AUTO: 18.8 % — SIGNIFICANT CHANGE UP (ref 13–44)
MCHC RBC-ENTMCNC: 28.3 PG — SIGNIFICANT CHANGE UP (ref 27–34)
MCHC RBC-ENTMCNC: 32.8 GM/DL — SIGNIFICANT CHANGE UP (ref 32–36)
MCV RBC AUTO: 86.2 FL — SIGNIFICANT CHANGE UP (ref 80–100)
MONOCYTES # BLD AUTO: 0.8 K/UL — SIGNIFICANT CHANGE UP (ref 0–0.9)
MONOCYTES NFR BLD AUTO: 8 % — SIGNIFICANT CHANGE UP (ref 2–14)
MRSA PCR RESULT.: SIGNIFICANT CHANGE UP
NEUTROPHILS # BLD AUTO: 6.96 K/UL — SIGNIFICANT CHANGE UP (ref 1.8–7.4)
NEUTROPHILS NFR BLD AUTO: 69.8 % — SIGNIFICANT CHANGE UP (ref 43–77)
NRBC # BLD: 0 /100 WBCS — SIGNIFICANT CHANGE UP (ref 0–0)
PLATELET # BLD AUTO: 285 K/UL — SIGNIFICANT CHANGE UP (ref 150–400)
POTASSIUM SERPL-MCNC: 4 MMOL/L — SIGNIFICANT CHANGE UP (ref 3.5–5.3)
POTASSIUM SERPL-SCNC: 4 MMOL/L — SIGNIFICANT CHANGE UP (ref 3.5–5.3)
RBC # BLD: 4.42 M/UL — SIGNIFICANT CHANGE UP (ref 4.2–5.8)
RBC # FLD: 12.4 % — SIGNIFICANT CHANGE UP (ref 10.3–14.5)
S AUREUS DNA NOSE QL NAA+PROBE: DETECTED
SODIUM SERPL-SCNC: 137 MMOL/L — SIGNIFICANT CHANGE UP (ref 135–145)
WBC # BLD: 9.97 K/UL — SIGNIFICANT CHANGE UP (ref 3.8–10.5)
WBC # FLD AUTO: 9.97 K/UL — SIGNIFICANT CHANGE UP (ref 3.8–10.5)

## 2022-08-04 PROCEDURE — 87640 STAPH A DNA AMP PROBE: CPT

## 2022-08-04 PROCEDURE — 87040 BLOOD CULTURE FOR BACTERIA: CPT

## 2022-08-04 PROCEDURE — 82248 BILIRUBIN DIRECT: CPT

## 2022-08-04 PROCEDURE — 80048 BASIC METABOLIC PNL TOTAL CA: CPT

## 2022-08-04 PROCEDURE — 96374 THER/PROPH/DIAG INJ IV PUSH: CPT

## 2022-08-04 PROCEDURE — 36415 COLL VENOUS BLD VENIPUNCTURE: CPT

## 2022-08-04 PROCEDURE — G0378: CPT

## 2022-08-04 PROCEDURE — 93971 EXTREMITY STUDY: CPT

## 2022-08-04 PROCEDURE — 87641 MR-STAPH DNA AMP PROBE: CPT

## 2022-08-04 PROCEDURE — 99285 EMERGENCY DEPT VISIT HI MDM: CPT | Mod: 25

## 2022-08-04 PROCEDURE — 99239 HOSP IP/OBS DSCHRG MGMT >30: CPT | Mod: GC

## 2022-08-04 PROCEDURE — 85730 THROMBOPLASTIN TIME PARTIAL: CPT

## 2022-08-04 PROCEDURE — 93005 ELECTROCARDIOGRAM TRACING: CPT

## 2022-08-04 PROCEDURE — U0005: CPT

## 2022-08-04 PROCEDURE — 85610 PROTHROMBIN TIME: CPT

## 2022-08-04 PROCEDURE — 96375 TX/PRO/DX INJ NEW DRUG ADDON: CPT

## 2022-08-04 PROCEDURE — 80053 COMPREHEN METABOLIC PANEL: CPT

## 2022-08-04 PROCEDURE — 85027 COMPLETE CBC AUTOMATED: CPT

## 2022-08-04 PROCEDURE — 85025 COMPLETE CBC W/AUTO DIFF WBC: CPT

## 2022-08-04 PROCEDURE — 73590 X-RAY EXAM OF LOWER LEG: CPT

## 2022-08-04 PROCEDURE — 83036 HEMOGLOBIN GLYCOSYLATED A1C: CPT

## 2022-08-04 PROCEDURE — 97161 PT EVAL LOW COMPLEX 20 MIN: CPT

## 2022-08-04 PROCEDURE — U0003: CPT

## 2022-08-04 PROCEDURE — 83605 ASSAY OF LACTIC ACID: CPT

## 2022-08-04 RX ORDER — LACTOBACILLUS ACIDOPHILUS 100MM CELL
1 CAPSULE ORAL
Qty: 6 | Refills: 0
Start: 2022-08-04 | End: 2022-08-09

## 2022-08-04 RX ORDER — CEPHALEXIN 500 MG
1 CAPSULE ORAL
Qty: 24 | Refills: 0
Start: 2022-08-04 | End: 2022-08-09

## 2022-08-04 RX ORDER — FUROSEMIDE 40 MG
1 TABLET ORAL
Qty: 3 | Refills: 0
Start: 2022-08-04 | End: 2022-08-06

## 2022-08-04 RX ADMIN — TRAMADOL HYDROCHLORIDE 25 MILLIGRAM(S): 50 TABLET ORAL at 00:00

## 2022-08-04 RX ADMIN — Medication 100 MILLIGRAM(S): at 03:29

## 2022-08-04 RX ADMIN — ENOXAPARIN SODIUM 40 MILLIGRAM(S): 100 INJECTION SUBCUTANEOUS at 03:21

## 2022-08-04 RX ADMIN — Medication 100 MILLIGRAM(S): at 09:40

## 2022-08-04 NOTE — DISCHARGE NOTE NURSING/CASE MANAGEMENT/SOCIAL WORK - NSDCPEFALRISK_GEN_ALL_CORE
For information on Fall & Injury Prevention, visit: [email or web address] OR  [email or web address] OR  https://www.cdc.gov/steadi/patient.html

## 2022-08-04 NOTE — DISCHARGE NOTE NURSING/CASE MANAGEMENT/SOCIAL WORK - PATIENT PORTAL LINK FT
You can access the FollowMyHealth Patient Portal offered by [facility] by registering at the following website: [email or web address]. By joining [employer]’s FollowMyHealth portal, you will also be able to view your health information using other applications (apps) compatible with our system.

## 2022-08-04 NOTE — DISCHARGE NOTE NURSING/CASE MANAGEMENT/SOCIAL WORK - NSDCFUADDAPPT_GEN_ALL_CORE_FT
follow up your pmd  office 7 to 10 days .  fu up vascular surgery out pt for further rt leg circulation studies.   rt  leg elevation , use rt leg venous stocking during ambulation .

## 2022-08-04 NOTE — DISCHARGE NOTE PROVIDER - NSDCMRMEDTOKEN_GEN_ALL_CORE_FT
Acidophilus Probiotic Blend oral capsule: 1 cap(s) orally once a day   cephalexin 500 mg oral tablet: 1 tab(s) orally 4 times a day   Lasix 20 mg oral tablet: 1 tab(s) orally once a day

## 2022-08-04 NOTE — DISCHARGE NOTE PROVIDER - NSDCCPCAREPLAN_GEN_ALL_CORE_FT
PRINCIPAL DISCHARGE DIAGNOSIS  Diagnosis: Cellulitis  Assessment and Plan of Treatment: you were treated with iv abx ancef / your cellulitis  better   switched to  po abx  , your  blood culture   was  neg . fu up your pmd in 7 to 10 days.      SECONDARY DISCHARGE DIAGNOSES  Diagnosis: Swelling of right lower extremity  Assessment and Plan of Treatment: -  you   started on  lasix 20 mg po daily /  use venous stocking  .

## 2022-08-04 NOTE — DISCHARGE NOTE PROVIDER - HOSPITAL COURSE
.47y old  Male who presents with a chief complaint of Observation, RLE swelling  and  redness      admitted with  RLE cellulitis   started   iv abx   improving on  1gm  ivpb  ancef  q8hr  , leucocytosis resolved      afebrile , blood cult neg    . hypokalemia -replaced  potassium chloride 40mEq x 1 dose - k wnl. rt  cellulitis resolved switch to po abx keflex 500 mg po q12 hr for 6 more days total 10 days course.     pt  suggested to  fu up vascular surgery out pt for further rt leg circulation studies.  medically stable   physical exam day of dc 8/4/22    Vital Signs Last 24 Hrs  T(C): 36.8 (04 Aug 2022 04:56), Max: 37.1 (03 Aug 2022 20:25)  T(F): 98.2 (04 Aug 2022 04:56), Max: 98.8 (03 Aug 2022 20:25)  HR: 93 (04 Aug 2022 04:56) (82 - 100)  BP: 128/85 (04 Aug 2022 04:56) (127/75 - 135/81)  BP(mean): --  RR: 18 (04 Aug 2022 04:56) (17 - 18)  SpO2: 96% (04 Aug 2022 04:56) (95% - 96%)    Parameters below as of 04 Aug 2022 04:56  Patient On (Oxygen Delivery Method): room air      HEAD:  Atraumatic, Normocephalic  EYES: EOMI, PERRLA, conjunctiva and sclera clear  ENMT:  Moist mucous membranes  NECK: Supple, No JVD  NERVOUS SYSTEM:  Alert & Oriented X3; Motor Strength 5/5 B/L upper and lower extremities; DTRs 2+ intact and symmetric  CHEST/LUNG:  percussion bilaterally; No rales, rhonchi, wheezing,   HEART: Regular rate and rhythm; No murmurs,  no tachy   ABDOMEN: Soft, Nontender, Nondistended; Bowel sounds present  EXTREMITIES:  2+ Peripheral Pulses, No clubbing, cyanosis, or edema  SKIN: No rashes or lesions , rt leg warmth and swelling , no wound   gu intact   total time spend 40 min . .47y old  Male who presents with a chief complaint of Observation, RLE swelling  and  redness      admitted with  RLE cellulitis   started   iv abx   improving on  1gm  ivpb  ancef  q8hr  , leucocytosis resolved      afebrile , blood cult neg    . hypokalemia -replaced  potassium chloride 40mEq x 1 dose - k wnl. rt  cellulitis resolved switch to po abx keflex 500 mg po q12 hr for 6 more days total 10 days course.     pt  suggested to  fu up vascular surgery out pt for further rt leg circulation studies.  medically stable   physical exam day of dc 8/4/22    Vital Signs Last 24 Hrs  T(C): 36.8 (04 Aug 2022 04:56), Max: 37.1 (03 Aug 2022 20:25)  T(F): 98.2 (04 Aug 2022 04:56), Max: 98.8 (03 Aug 2022 20:25)  HR: 93 (04 Aug 2022 04:56) (82 - 100)  BP: 128/85 (04 Aug 2022 04:56) (127/75 - 135/81)  BP(mean): --  RR: 18 (04 Aug 2022 04:56) (17 - 18)  SpO2: 96% (04 Aug 2022 04:56) (95% - 96%)      HEAD:  Atraumatic, Normocephalic  EYES: EOMI, PERRLA, conjunctiva and sclera clear  ENMT:  Moist mucous membranes  NECK: Supple, No JVD  NERVOUS SYSTEM:  Alert & Oriented X3; Motor Strength 5/5 B/L upper and lower extremities; DTRs 2+ intact and symmetric  CHEST/LUNG:  percussion bilaterally; No rales, rhonchi, wheezing,   HEART: Regular rate and rhythm; No murmurs,  no tachy   ABDOMEN: Soft, Nontender, Nondistended; Bowel sounds present  EXTREMITIES:  2+ Peripheral Pulses, No clubbing, cyanosis, or edema  SKIN: No rashes or lesions , rt leg warmth and swelling , no wound   gu intact   total time spend 40 min . .47y old  Male who presents with a chief complaint of Observation, RLE swelling  and  redness      admitted with  RLE cellulitis   started   iv abx   improving on  1gm  ivpb  ancef  q8hr  , leucocytosis resolved      afebrile , blood cult neg    . hypokalemia -replaced  potassium chloride 40mEq x 1 dose - k wnl. rt  cellulitis resolved switch to po abx keflex 500 mg po q12 hr for 6 more days total 10 days course.     pt  suggested to  fu up vascular surgery out pt for further rt leg circulation studies.  medically stable   physical exam day of dc 8/4/22    Vital Signs Last 24 Hrs  T(C): 36.8 (04 Aug 2022 04:56), Max: 37.1 (03 Aug 2022 20:25)  T(F): 98.2 (04 Aug 2022 04:56), Max: 98.8 (03 Aug 2022 20:25)  HR: 93 (04 Aug 2022 04:56) (82 - 100)  BP: 128/85 (04 Aug 2022 04:56) (127/75 - 135/81)  BP(mean): --  RR: 18 (04 Aug 2022 04:56) (17 - 18)  SpO2: 96% (04 Aug 2022 04:56) (95% - 96%)    gen physical exam   HEAD:  Atraumatic, Normocephalic  EYES: EOMI, PERRLA, conjunctiva and sclera clear  ENMT:  Moist mucous membranes  NECK: Supple, No JVD  NERVOUS SYSTEM:  Alert & Oriented X3; Motor Strength 5/5 B/L upper and lower extremities; DTRs 2+ intact and symmetric  CHEST/LUNG:  percussion bilaterally; No rales, rhonchi, wheezing,   HEART: Regular rate and rhythm; No murmurs,  no tachy   ABDOMEN: Soft, Nontender, Nondistended; Bowel sounds present  EXTREMITIES:  2+ Peripheral Pulses, No clubbing, cyanosis, or edema  SKIN: No rashes or lesions , rt leg warmth and swelling , no wound   gu intact   total time spend 40 min .

## 2022-08-04 NOTE — DISCHARGE NOTE PROVIDER - PROVIDER TOKENS
FREE:[LAST:[Outpatient Providers	PCP: [facility]],PHONE:[(   )    -],FAX:[(   )    -]]

## 2022-08-04 NOTE — DISCHARGE NOTE PROVIDER - CARE PROVIDER_API CALL
Outpatient Providers	PCP: [facility],   Phone: (   )    -  Fax: (   )    -  Follow Up Time:

## 2022-08-04 NOTE — DISCHARGE NOTE PROVIDER - NSDCFUADDAPPT_GEN_ALL_CORE_FT
follow up your pmd  office 7 to 10 days .  fu up vascular surgery out pt for further rt leg circulation studies. follow up your pmd  office 7 to 10 days .  fu up vascular surgery out pt for further rt leg circulation studies.   rt  leg elevation , use rt leg venous stocking during ambulation .  follow up your pmd  office 7 to 10 days .  fu up vascular surgery out pt for further rt leg circulation studies.   rt  leg elevation , use rt leg venous stocking during ambulation .  work in sitting position.

## 2022-08-06 LAB
CULTURE RESULTS: SIGNIFICANT CHANGE UP
CULTURE RESULTS: SIGNIFICANT CHANGE UP
SPECIMEN SOURCE: SIGNIFICANT CHANGE UP
SPECIMEN SOURCE: SIGNIFICANT CHANGE UP

## 2022-09-26 NOTE — PHYSICAL THERAPY INITIAL EVALUATION ADULT - PLANNED THERAPY INTERVENTIONS, PT EVAL
Render Post-Care Instructions In Note?: no Detail Level: Detailed Show Applicator Variable?: Yes Application Tool (Optional): Cry-AC Duration Of Freeze Thaw-Cycle (Seconds): 10 Post-Care Instructions: I reviewed with the patient in detail post-care instructions. Patient is to wear sunprotection, and avoid picking at any of the treated lesions. Pt may apply Vaseline to crusted or scabbing areas. Number Of Freeze-Thaw Cycles: 1 freeze-thaw cycle Consent: The patient's consent was obtained including but not limited to risks of crusting, scabbing, blistering, scarring, darker or lighter pigmentary change, recurrence, incomplete removal and infection. cleared from PT, independent

## 2022-12-05 NOTE — ED ADULT NURSE NOTE - CAS EDN DISCHARGE ASSESSMENT
normal/soft/nontender/nondistended/normal active bowel sounds
Alert and oriented to person, place and time

## 2022-12-21 NOTE — PHYSICAL THERAPY INITIAL EVALUATION ADULT - ADDITIONAL COMMENTS
Patient reports that he was independent with all mobility/ADLs PTA. Lives in a private house with no stairs. with patient

## 2024-07-16 ENCOUNTER — OFFICE (OUTPATIENT)
Dept: URBAN - METROPOLITAN AREA CLINIC 63 | Facility: CLINIC | Age: 49
Setting detail: OPHTHALMOLOGY
End: 2024-07-16
Payer: COMMERCIAL

## 2024-07-16 DIAGNOSIS — H00.11: ICD-10-CM

## 2024-07-16 PROCEDURE — 92002 INTRM OPH EXAM NEW PATIENT: CPT | Performed by: STUDENT IN AN ORGANIZED HEALTH CARE EDUCATION/TRAINING PROGRAM

## 2024-07-16 ASSESSMENT — CONFRONTATIONAL VISUAL FIELD TEST (CVF)
OS_FINDINGS: FULL
OD_FINDINGS: FULL

## 2024-07-26 NOTE — ED PROVIDER NOTE - NSICDXPASTSURGICALHX_GEN_ALL_CORE_FT
PAST SURGICAL HISTORY:  No significant past surgical history What Type Of Note Output Would You Prefer (Optional)?: Standard Output How Severe Is Your Acne?: mild Is This A New Presentation, Or A Follow-Up?: Acne

## 2024-08-07 ENCOUNTER — OFFICE (OUTPATIENT)
Dept: URBAN - METROPOLITAN AREA CLINIC 104 | Facility: CLINIC | Age: 49
Setting detail: OPHTHALMOLOGY
End: 2024-08-07
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
## 2024-08-07 PROCEDURE — NO SHOW FE NO SHOW FEE: Performed by: OPHTHALMOLOGY
